# Patient Record
(demographics unavailable — no encounter records)

---

## 2024-11-08 NOTE — LETTER BODY
[Dear  ___] : Dear  [unfilled], [Consult Letter:] : I had the pleasure of evaluating your patient, [unfilled]. [Consult Closing:] : Thank you very much for allowing me to participate in the care of this patient.  If you have any questions, please do not hesitate to contact me. [FreeTextEntry1] : [facility]\par  [address] \par  [place], [place], [address]  \par  [phone]

## 2024-11-08 NOTE — ASSESSMENT
[FreeTextEntry1] : She is asymptomatic right now.  Recent ultrasound showed no kidney stones.  She will continue to follow-up with nephrology.  Other treatment options such as Thiola was discussed but given the side effects of the medication she would rather continue with potassium citrate at this time.  She may follow-up in about 6 months with repeat imaging studies with ultrasound.

## 2024-11-08 NOTE — HISTORY OF PRESENT ILLNESS
[FreeTextEntry1] : She is a 42 year-old woman who is seen today in follow-up for left ureterocele, nephrolithiasis and cystinuria.  She underwent left ureteroscopy in July 2024 for a 1.8 cm left UPJ stone and other stones in the left kidney up to 7 mm.  She also had a tiny stone in the right kidney.  She has no flank pain at this time and following with nephrology.  Ultrasound from [facility] radiology in October 2024 showed no kidney stones.  She is on potassium citrate.  Stone was composed cystine.   Previous notes: On August 2023 she went to the hospital for right-sided flank pain.  CT scan showed an 8 x 4 mm right distal UVJ stone and a right renal 5 mm stone.  Subsequently she underwent ureteroscopy and laser lithotripsy of stones.  Stent has been removed.  Stone composition was 100% cystine.  There is no family history of cystinuria.  24-hour urine collection showed volume 0.94, calcium 66, oxalate 37, citrate 743 and pH was 6.4.  Ultrasound in October 2023 from [facility] radiology showed a 5 mm left kidney stone.    In March 2022, she went to emergency room for left-sided flank pain.  CT scan showed left hydronephrosis and a 1.7 cm left distal ureteral stone.  In March 202 she underwent incision of ureterocele, ureteroscopy and laser lithotripsy of stone.

## 2025-07-07 NOTE — HISTORY OF PRESENT ILLNESS
[FreeTextEntry1] : She is a 43 year-old woman who is seen today in follow-up for left ureterocele, nephrolithiasis and cystinuria.  Recently she was having right-sided flank pain.  Ultrasound was done at [facility] which showed right-sided kidney stones 1.2 and 1.7 cm and left-sided 1 cm and 6 mm stones.  She is trying to stay hydrated.  She follows with nephrology.  She underwent left ureteroscopy in July 2024 for a 1.8 cm left UPJ stone and other stones in the left kidney up to 7 mm.  She also had a tiny stone in the right kidney.  Ultrasound from [facility] in October 2024 showed no kidney stones.  She is on potassium citrate.  Stone was composed cystine.   Previous notes: On August 2023 she went to the hospital for right-sided flank pain.  CT scan showed an 8 x 4 mm right distal UVJ stone and a right renal 5 mm stone.  Subsequently she underwent ureteroscopy and laser lithotripsy of stones.  Stent has been removed.  Stone composition was 100% cystine.  There is no family history of cystinuria.  24-hour urine collection showed volume 0.94, calcium 66, oxalate 37, citrate 743 and pH was 6.4.  Ultrasound in October 2023 from [facility] showed a 5 mm left kidney stone.    In March 2022, she went to emergency room for left-sided flank pain.  CT scan showed left hydronephrosis and a 1.7 cm left distal ureteral stone.  In March 202 she underwent incision of ureterocele, ureteroscopy and laser lithotripsy of stone.

## 2025-07-07 NOTE — ASSESSMENT
[FreeTextEntry1] : She will try to remain hydrated.  She will undergo noncontrast CT scan for assessment of recurrent kidney stones.  She is following closely with nephrology.  We will discuss the results of the next step on the phone.  She has no fever or chills at this time.  Stone analysis was reviewed with her.  Boy Acosta MD, FACS [facility]  of Urology [address] [place] [address], [address] [place] [address] Tel: [phone] Fax: [phone]